# Patient Record
(demographics unavailable — no encounter records)

---

## 2024-10-08 NOTE — HISTORY OF PRESENT ILLNESS
[FreeTextEntry1] : follow up Interview and discussion conducted in Persian by Persian speaking physician [de-identified] : 69 years old male with bladder carcinoma, HTN, DM presents for follow up , states feeling well, due for labs to monitor lipids, a1c but not fasting,

## 2024-10-08 NOTE — PLAN
[FreeTextEntry1] : 1. bladder cancer * stable, f/u with oncologist 2. type 2 DM * referral for lab a1c * Dietary counseling given, dietary avoidance discussed, diet and exercise reviewed with patient; patient reminded of importance of aerobic exercise, weight control, dietary compliance and regular glucose monitoring * c/w glimepiride and metformin 3. HTN * c/w losartan 4. hyperlipidemia * c/w atorvastatin * low cholesterol, low triglycerides diet, dietary counseling given; dietary avoidance discussed; diet and exercise reviewed with patient * referral for labs fasting lipids 5. need for influenza vaccination * high dose influenza vaccine administered * f/u as scheduled

## 2025-01-22 NOTE — PLAN
[FreeTextEntry1] : 1. HTN * BP well controlled * c/w losartan 50 mg, refilled 2. controlled type 2 DM * lab for fasting CMP, A1c * c/w glimepiride and metformin * Dietary counseling given, dietary avoidance discussed, diet and exercise reviewed with patient; patient reminded of importance of aerobic exercise, weight control, dietary compliance and regular glucose monitoring 3. hypertriglyceridemia * lab for fasting lipid panel * low cholesterol, low triglycerides diet, dietary counseling given; dietary avoidance discussed; diet and exercise reviewed with patient * c/w atorvastatin 10 mg, refilled 4. bladder carcinoma * he is stable, advised to c/w follow up with oncologist at [facility] 5. cystostomy in place * urinalysis * to f/u with urologist at [facility] * f/u in six months

## 2025-01-22 NOTE — HISTORY OF PRESENT ILLNESS
[FreeTextEntry1] : follow up Interview and discussion conducted in Ukrainian by Ukrainian speaking physician [de-identified] : 69 years old male with bladder carcinoma, DM, HTN presents for follow up , he states feeling well. offers no complaints, has permanent cystostomy, states he only receives 5 bags for month , b/o insurance only approved that amount , he uses more that, urine clear non odorous. he had labs done in October last year, results reviewed, discussed

## 2025-07-09 NOTE — PLAN
[FreeTextEntry1] : 1. hospital discharge follow up  * discharge summary reviewed, medication reconciliation done. 2. poorly controlled DM * labs for cmp, a1c, lipid panel * Dietary counseling given, dietary avoidance discussed, diet and exercise reviewed with patient; patient reminded of importance of aerobic exercise, weight control, dietary compliance and regular glucose monitoring * c/w insulin 3. HTN * c/w nifedipine 30 mg 4. bladder cancer * stable, f/u with oncologist  5. hyperlipidemia * low cholesterol, low triglycerides diet, dietary counseling given; dietary avoidance discussed; diet and exercise reviewed with patient * c/w atorvastatin 10 mg * f/u in three months

## 2025-07-09 NOTE — HISTORY OF PRESENT ILLNESS
[Post-hospitalization from ___ Hospital] : Post-hospitalization from [unfilled] Hospital [Admitted on: ___] : The patient was admitted on [unfilled] [Discharged on ___] : discharged on [unfilled] [Discharge Summary] : discharge summary [Pertinent Labs] : pertinent labs [Radiology Findings] : radiology findings [Discharge Med List] : discharge medication list [Med Reconciliation] : medication reconciliation has been completed [Patient Contacted By: ____] : and contacted by [unfilled] [FreeTextEntry2] : 69 years old male with HTN, T2DM, bladder carcinoma presents for follow up after hospitalization at [facility] on 6/26; discharged on 6/29 for hypertensive emergency, IRIS , he presented with nausea, vomiting, headache, for two days, had stopped medications; was found with /140, elevated creatinine, admitted , treated with IV labetalol and cardene drip, BP was then controlled, had CT head and chest , reports reviewed, normal, had CT abdomen showed, renal artery stenosis and hydronephrosis, losartan discontinued, also with hyperglycemia , started on insulin; on 6/29 he was clinically stable and discharged home; discharge summary reviewed, medication reconciliation done, labs and imaging results reviewed, today he states feeling much better, offers no complaints.

## 2025-07-09 NOTE — HISTORY OF PRESENT ILLNESS
[Post-hospitalization from ___ Hospital] : Post-hospitalization from [unfilled] Hospital [Admitted on: ___] : The patient was admitted on [unfilled] [Discharged on ___] : discharged on [unfilled] [Discharge Summary] : discharge summary [Pertinent Labs] : pertinent labs [Radiology Findings] : radiology findings [Discharge Med List] : discharge medication list [Med Reconciliation] : medication reconciliation has been completed [Patient Contacted By: ____] : and contacted by [unfilled] [FreeTextEntry2] : 69 years old male with HTN, T2DM, bladder carcinoma presents for follow up after hospitalization at [facility] on 6/26; discharged on 6/29 for hypertensive emergency, IRIS , he presented with nausea, vomiting, headache, for two days, had stopped medications; was found with /140, elevated creatinine, admitted , treated with IV labetalol and cardene drip, BP was then controlled, had CT head and chest , reports reviewed, normal, had CT abdomen showed, renal artery stenosis and hydronephrosis, losartan discontinued, also with hyperglycemia , started on insulin; on 6/29 he was clinically stable and discharged home; discharge summary reviewed, medication reconciliation done, labs and imaging results reviewed, today he states feeling much better, offers no complaints.

## 2025-07-09 NOTE — HISTORY OF PRESENT ILLNESS
[Post-hospitalization from ___ Hospital] : Post-hospitalization from [unfilled] Hospital [Admitted on: ___] : The patient was admitted on [unfilled] [Discharged on ___] : discharged on [unfilled] [Discharge Summary] : discharge summary [Pertinent Labs] : pertinent labs [Radiology Findings] : radiology findings [Discharge Med List] : discharge medication list [Med Reconciliation] : medication reconciliation has been completed [Patient Contacted By: ____] : and contacted by [unfilled] [FreeTextEntry2] : 69 years old male with HTN, T2DM, bladder carcinoma presents for follow up after hospitalization at [facility] on 6/26; discharged on 6/29 for hypertensive emergency, IRIS , he presented with nausea, vomiting, headache, for two days, had stopped medications; was found with /140, elevated creatinine, admitted , treated with IV labetalol and cardene drip, BP was then controlled, had CT head and chest , reports reviewed, normal, had CT abdomen showed, renal artery stenosis and hydronephrosis, losartan discontinued, also with hyperglycemia , started on insulin; on 6/29 he was clinically stable and discharged home; discharge summary reviewed, medication reconciliation done, labs and imaging results reviewed, today he states feeling much better, offers no complaints.

## 2025-07-23 NOTE — HEALTH RISK ASSESSMENT
[Good] : ~his/her~  mood as  good [No] : No [No falls in past year] : Patient reported no falls in the past year [0] : 2) Feeling down, depressed, or hopeless: Not at all (0) [PHQ-2 Negative - No further assessment needed] : PHQ-2 Negative - No further assessment needed [Yes] : Reviewed medication list for presence of high-risk medications. [Never] : Never [NO] : No [Patient reported colonoscopy was abnormal] : Patient reported colonoscopy was abnormal [HIV test declined] : HIV test declined [Hepatitis C test declined] : Hepatitis C test declined [With Family] : lives with family [Retired] : retired [Less Than High School] : less than high school [] :  [# Of Children ___] : has [unfilled] children [Fully functional (bathing, dressing, toileting, transferring, walking, feeding)] : Fully functional (bathing, dressing, toileting, transferring, walking, feeding) [Fully functional (using the telephone, shopping, preparing meals, housekeeping, doing laundry, using] : Fully functional and needs no help or supervision to perform IADLs (using the telephone, shopping, preparing meals, housekeeping, doing laundry, using transportation, managing medications and managing finances) [Smoke Detector] : smoke detector [Carbon Monoxide Detector] : carbon monoxide detector [Seat Belt] :  uses seat belt [[id number]] : 0 [Change in mental status noted] : No change in mental status noted [Sexually Active] : not sexually active [Reports changes in hearing] : Reports no changes in hearing [Reports changes in vision] : Reports no changes in vision [Reports changes in dental health] : Reports no changes in dental health [ColonoscopyDate] : 03/2024 [ColonoscopyComments] : diverticulosis; polyps

## 2025-07-23 NOTE — PLAN
[FreeTextEntry1] : 1. annual physical exam * routine general labs done * age appropriate health maintenance recommendations reviewed, discussed including healthy diet, regular exercise 2. HTN * c/w nifedipine 3. poorly controlled DM * dietary adherence compliance stressed * c/w insulin and metformin * Dietary counseling given, dietary avoidance discussed, diet and exercise reviewed with patient; patient reminded of importance of aerobic exercise, weight control, dietary compliance and regular glucose monitoring 4. bladder cancer * s/p permanent cystostomy, stable * f/u with urologist and oncologist 5. hyperlipidemia * low cholesterol, low triglycerides diet, dietary counseling given; dietary avoidance discussed; diet and exercise reviewed with patient * c/w atorvastatin 6. colon cancer screening * UTD with colonoscopy * f/u in three months